# Patient Record
Sex: FEMALE | Race: WHITE
[De-identification: names, ages, dates, MRNs, and addresses within clinical notes are randomized per-mention and may not be internally consistent; named-entity substitution may affect disease eponyms.]

---

## 2018-12-20 ENCOUNTER — HOSPITAL ENCOUNTER (EMERGENCY)
Dept: HOSPITAL 97 - ER | Age: 33
Discharge: ANOTHER HOSPITAL | End: 2018-12-20
Payer: COMMERCIAL

## 2018-12-20 DIAGNOSIS — L03.211: Primary | ICD-10-CM

## 2018-12-20 DIAGNOSIS — K04.7: ICD-10-CM

## 2018-12-20 LAB
BUN BLD-MCNC: 8 MG/DL (ref 7–18)
GLUCOSE SERPLBLD-MCNC: 103 MG/DL (ref 74–106)
HCT VFR BLD CALC: 36.5 % (ref 36–45)
LYMPHOCYTES # SPEC AUTO: 1.4 K/UL (ref 0.7–4.9)
MCH RBC QN AUTO: 30.6 PG (ref 27–35)
MCV RBC: 91.8 FL (ref 80–100)
PMV BLD: 9 FL (ref 7.6–11.3)
POTASSIUM SERPL-SCNC: 4.2 MMOL/L (ref 3.5–5.1)
RBC # BLD: 3.97 M/UL (ref 3.86–4.86)

## 2018-12-20 PROCEDURE — 76377 3D RENDER W/INTRP POSTPROCES: CPT

## 2018-12-20 PROCEDURE — 96367 TX/PROPH/DG ADDL SEQ IV INF: CPT

## 2018-12-20 PROCEDURE — 87040 BLOOD CULTURE FOR BACTERIA: CPT

## 2018-12-20 PROCEDURE — 99285 EMERGENCY DEPT VISIT HI MDM: CPT

## 2018-12-20 PROCEDURE — 81003 URINALYSIS AUTO W/O SCOPE: CPT

## 2018-12-20 PROCEDURE — 80048 BASIC METABOLIC PNL TOTAL CA: CPT

## 2018-12-20 PROCEDURE — 36415 COLL VENOUS BLD VENIPUNCTURE: CPT

## 2018-12-20 PROCEDURE — 81025 URINE PREGNANCY TEST: CPT

## 2018-12-20 PROCEDURE — 96365 THER/PROPH/DIAG IV INF INIT: CPT

## 2018-12-20 PROCEDURE — 85025 COMPLETE CBC W/AUTO DIFF WBC: CPT

## 2018-12-20 PROCEDURE — 70487 CT MAXILLOFACIAL W/DYE: CPT

## 2018-12-20 PROCEDURE — 96361 HYDRATE IV INFUSION ADD-ON: CPT

## 2018-12-20 NOTE — EDPHYS
Physician Documentation                                                                           

 [facility]                                                                

Name: Emma Nelson                                                                                

Age: 33 yrs                                                                                       

Sex: Female                                                                                       

: 1985                                                                                   

MRN: [id number]                                                                                   

Arrival Date: 2018                                                                          

Time: 09:37                                                                                       

Account#: [id number]                                                                            

Bed 8                                                                                             

Private MD: None, None                                                                            

ED Physician Tanvir Mcdonough                                                                       

HPI:                                                                                              

                                                                                             

10:05 This 33 yrs old  Female presents to ER via Ambulatory with complaints of       cp  

      Facial Swelling.                                                                            

10:05 The patient presents with swelling.                                                     cp  

10:05 The problem is located in the right facial cheek. Onset: The symptoms/episode           cp  

      began/occurred 2 day(s) ago. Duration: The symptoms are continuous, and are steadily        

      getting worse. Associated signs and symptoms: Pertinent negatives: dysphagia, fever,        

      inability to eat, redness in area, vomiting.                                                

                                                                                                  

OB/GYN:                                                                                           

09:54 LMP 2018                                                                           hb  

                                                                                                  

Historical:                                                                                       

- Allergies:                                                                                      

09:54 No Known Allergies;                                                                     hb  

09:54 No Known Allergies;                                                                     tw2 

- Home Meds:                                                                                      

09:54 None [Active];                                                                          hb  

09:54 None [Active];                                                                          tw2 

- PMHx:                                                                                           

09:54 None;                                                                                   hb  

09:54 None;                                                                                   tw2 

- PSHx:                                                                                           

09:54 ;                                                                              hb  

09:54 ;                                                                              tw2 

                                                                                                  

- Immunization history:: Adult Immunizations up to date, Adult Immunizations.                     

- Social history:: Smoking status: Patient/guardian denies using tobacco, Smoking                 

  status: .                                                                                       

- Ebola Screening: : No symptoms or risks identified at this time Patient denies travel           

  to an Ebola-affected area in the 21 days before illness onset.                                  

                                                                                                  

                                                                                                  

ROS:                                                                                              

10:10 Constitutional: Negative for body aches, chills, fever, poor PO intake.                 cp  

10:10 Eyes: Negative for injury, pain, redness, and discharge.                                cp  

10:10 ENT: Negative for drainage from ear(s), ear pain, sore throat, difficulty swallowing,       

      difficulty handling secretions.                                                             

10:10 Cardiovascular: Negative for chest pain, edema, palpitations.                               

10:10 Respiratory: Negative for cough, shortness of breath, wheezing.                             

10:10 Abdomen/GI: Negative for abdominal pain, nausea, vomiting, and diarrhea, anorexia,          

      black/tarry stool, rectal bleeding.                                                         

10:10 Back: Negative for pain at rest, pain with movement, radiated pain.                         

10:10 Skin: Positive for swelling, of the right facial cheek.                                     

10:10 Neuro: Negative for altered mental status, headache, weakness.                              

10:10 All other systems are negative.                                                             

                                                                                                  

Exam:                                                                                             

10:15 Constitutional: The patient appears in no acute distress, alert, awake, non-toxic, well cp  

      developed, well nourished.                                                                  

10:15 Head/face: Noted is swelling, that is mild, of the  right cheek, tenderness, that is    cp  

      mild, of the  right cheek.                                                                  

10:15 Eyes: Periorbital structures: appear normal, Pupils: equal, round, and reactive to          

      light and accomodation, Extraocular movements: intact throughout, Conjunctiva: normal,      

      no exudate, no injection, Sclera: no appreciated abnormality, Lids and lashes: appear       

      normal, bilaterally.                                                                        

10:15 ENT: External ear(s): are unremarkable, Ear canal(s): are normal, clear, TM's: bulging,     

      is not appreciated, bilaterally, dullness, bilaterally, erythema, is not appreciated,       

      bilaterally, Nose: is normal, Mouth: Lips: moist, Oral mucosa: pink and intact, moist,      

      Posterior pharynx: Airway: no evidence of obstruction, patent, Tonsils: are normal in       

      appearance, Uvula: midline, swelling, is not appreciated, erythema, is not appreciated,     

      exudate, is not appreciated, Dental exam: dental caries, that is moderate, diffusely,       

      gum swelling, that is mild, specifically in the  right upper outer gumline, pain, that      

      is mild, specifically in the  right upper, Voice: is normal.                                

10:15 Neck: ROM/movement: is normal, is supple, without pain, no range of motions                 

      limitations, no meningismus, no nuchal rigidity.                                            

10:15 Chest/axilla: Inspection: normal, Palpation: is normal, no crepitus, no tenderness.         

10:15 Cardiovascular: Rate: tachycardic, Rhythm: regular.                                         

10:15 Respiratory: the patient does not display signs of respiratory distress,  Respirations:     

      normal, no use of accessory muscles, no retractions, no splinting, no tachypnea,            

      labored breathing, is not present, Breath sounds: are clear throughout, no decreased        

      breath sounds, no stridor, no wheezing.                                                     

10:15 Abdomen/GI: Inspection: abdomen appears normal, Palpation: abdomen is soft and              

      non-tender, in all quadrants.                                                               

10:15 Neuro: Orientation: to person, place \T\ time. Mentation: is normal, Cerebellar function:   

      is grossly normal, Motor: moves all fours, strength is normal, Sensation: is normal.        

                                                                                                  

Vital Signs:                                                                                      

09:54  / 100; Pulse 103; Resp 16; Temp 98; Pulse Ox 100% on R/A; Pain 2/10;             hb  

10:20  / 87; Pulse 83; Resp 17; Pulse Ox 100% on R/A;                                   tw2 

11:30  / 86; Pulse 66; Resp 17; Pulse Ox 100% on R/A;                                   tw2 

12:30  / 78; Pulse 66; Resp 17; Pulse Ox 99% on R/A;                                    tw2 

13:30  / 90; Pulse 66; Resp 17; Pulse Ox 99% on R/A;                                    tw2 

                                                                                                  

MDM:                                                                                              

09:52 Patient medically screened.                                                             cp  

10:15 Differential diagnosis: dental caries, dental abscess, pericoronitis, cellulitis.       cp  

12:00 Data reviewed: vital signs, nurses notes, lab test result(s), radiologic studies, CT    cp  

      scan, and as a result, I will administer antibiotics Flagyl, Clindamycin.                   

12:00 Counseling: I had a detailed discussion with the patient and/or guardian regarding: the cp  

      historical points, exam findings, and any diagnostic results supporting the                 

      discharge/admit diagnosis, lab results, radiology results, the need to transfer to          

      another facility, [facility] does not immediately have the required       

      specialist. Response to treatment: the patient's symptoms have mildly improved after        

      treatment.                                                                                  

13:20 Physician consultation: DR Gonzalez, maxillary facial surgeon \T\[facility], will accept patient  cp  

      as transfer to ED.                                                                          

                                                                                                  

                                                                                             

10:00 Order name: CBC with Diff; Complete Time: 11:05                                         cp  

                                                                                             

11:05 Interpretation: Normal except: RDW 15.9; MARCO ANTONIO% 77.7.                                     cp  

                                                                                             

10:00 Order name: BMP; Complete Time: 10:57                                                   cp  

                                                                                             

10:57 Interpretation: Normal except: .                                                  cp  

                                                                                             

10:00 Order name: CT Facial Bones W/ Con \T\ Mpr; Complete Time: 11:48                          cp

                                                                                             

10:00 Order name: Blood Culture Adult (2)                                                     cp  

                                                                                             

11:30 Order name: Urine Dipstick--Ancillary (enter results); Complete Time: 12:55             eb  

                                                                                             

12:55 Interpretation: Reviewed.                                                               cp  

                                                                                             

11:30 Order name: Urine Pregnancy--Ancillary (enter results); Complete Time: 12:55            eb  

                                                                                             

12:55 Interpretation: Reviewed.                                                               cp  

                                                                                             

10:00 Order name: Urine Dipstick-Ancillary (obtain specimen); Complete Time: 12:26            cp  

                                                                                             

10:00 Order name: Urine Pregnancy Test (obtain specimen); Complete Time: 12:26                cp  

                                                                                             

10:00 Order name: IV; Complete Time: 10:19                                                    cp  

                                                                                             

12:22 Order name: NPO; Complete Time: 14:03                                                   cp  

                                                                                                  

Administered Medications:                                                                         

10:19 Drug: NS 0.9% 1000 ml Route: IV; Rate: 1 bolus; Site: left antecubital;                 tw2 

13:10 Follow up: Response: No adverse reaction; IV Status: Completed infusion; IV Intake:     tw2 

      1000ml                                                                                      

12:20 Drug: Clindamycin 600 mg Route: IVPB; Infused Over: 30 mins; Site: left antecubital;    tw2 

12:50 Follow up: Response: No adverse reaction; IV Status: Completed infusion                 tw2 

13:33 Drug: metroNIDAZOLE 500 mg Volume: 100 ml; Route: IVPB; Infused Over: 30 mins; Site:    sg  

      left antecubital;                                                                           

13:55 Follow up: Response: No adverse reaction; IV Status: Completed infusion                 tw2 

                                                                                                  

                                                                                                  

Disposition:                                                                                      

15:20 Chart complete.                                                                         cp  

                                                                                                  

Disposition:                                                                                      

18 13:25 Transfer ordered to [facility]. Diagnosis are Cellulitis of face, Right        

  periapical maxillary tooth abscess.                                                             

- Reason for transfer: Specialty.                                                                 

- Accepting physician is DR ZHANNA Gonzalez.                                                            

- Condition is Stable.                                                                            

- Problem is new.                                                                                 

- Symptoms have improved.                                                                         

                                                                                                  

                                                                                                  

                                                                                                  

Signatures:                                                                                       

Dispatcher [employer]                           Jonathan Kirkland RN                         RN   Edin Elliott PA PA   cp                                                   

Analia Dunbar RN RN hb Wise, Tara, RN                          RN   tw2                                                  

Camryn Telles                                   [facility]                                                  

                                                                                                  

Corrections: (The following items were deleted from the chart)                                    

13:26 13:25 2018 13:25 Transfer ordered to [facility]. Diagnosis is Cellulitis of   cp  

      face; Dental Abscess. Reason for transfer: Specialty. Accepting physician is DR ZHANNA Gonzalez. Condition is Stable. Problem is new. Symptoms have improved. cp                     

15:14 13:26 2018 13:25 Transfer ordered to [facility]. Diagnosis is Cellulitis of   lt1 

      face; Right periapical maxillary tooth abscess. Reason for transfer: Specialty.             

      Accepting physician is DR ZHANNA Gonzalez. Condition is Stable. Problem is new. Symptoms have     

      improved. cp                                                                                

                                                                                                  

**************************************************************************************************

## 2018-12-20 NOTE — ER
Nurse's Notes                                                                                     

 [facility]                                                                

Name: Emma Nelson                                                                                

Age: 33 yrs                                                                                       

Sex: Female                                                                                       

: 1985                                                                                   

MRN: [id number]                                                                                   

Arrival Date: 2018                                                                          

Time: 09:37                                                                                       

Account#: [id number]                                                                            

Bed 8                                                                                             

Private MD: None, None                                                                            

Diagnosis: Cellulitis of face;Right periapical maxillary tooth abscess                            

                                                                                                  

Presentation:                                                                                     

                                                                                             

09:52 Presenting complaint: Right sided facial swelling x 2 days. Transition of care: patient hb  

      was not received from another setting of care. Onset of symptoms was 2018.     

      Risk Assessment: Do you want to hurt yourself or someone else? Patient reports no           

      desire to harm self or others. Care prior to arrival: None.                                 

09:52 Method Of Arrival: Ambulatory                                                           hb  

09:52 Acuity: MILA 3                                                                           hb  

09:54 Initial Sepsis Screen: Does the patient meet any 2 criteria? No. Patient's initial      hb  

      sepsis screen is negative. Does the patient have a suspected source of infection? No.       

      Patient's initial sepsis screen is negative.                                                

                                                                                                  

OB/GYN:                                                                                           

09:54 LMP 2018                                                                           hb  

                                                                                                  

Historical:                                                                                       

- Allergies:                                                                                      

09:54 No Known Allergies;                                                                     hb  

09:54 No Known Allergies;                                                                     tw2 

- Home Meds:                                                                                      

09:54 None [Active];                                                                          hb  

09:54 None [Active];                                                                          tw2 

- PMHx:                                                                                           

09:54 None;                                                                                   hb  

09:54 None;                                                                                   tw2 

- PSHx:                                                                                           

09:54 ;                                                                              hb  

09:54 ;                                                                              tw2 

                                                                                                  

- Immunization history:: Adult Immunizations up to date, Adult Immunizations.                     

- Social history:: Smoking status: Patient/guardian denies using tobacco, Smoking                 

  status: .                                                                                       

- Ebola Screening: : No symptoms or risks identified at this time Patient denies travel           

  to an Ebola-affected area in the 21 days before illness onset.                                  

                                                                                                  

                                                                                                  

Screenin:51 Abuse screen: Denies threats or abuse. Nutritional screening: No deficits noted.        tw2 

      Tuberculosis screening: No symptoms or risk factors identified. Fall Risk None              

      identified.                                                                                 

                                                                                                  

Assessment:                                                                                       

09:54 General: Appears in no apparent distress. Behavior is calm, cooperative, appropriate    tw2 

      for age. Pain: Complains of pain in right cheek and right jaw. Neuro: Level of              

      Consciousness is awake, alert, obeys commands, Oriented to person, place, time.             

      Cardiovascular: Capillary refill < 3 seconds Patient's skin is warm and dry.                

      Respiratory: Airway is patent Respiratory effort is even, unlabored, Respiratory            

      pattern is regular, symmetrical. GI: No signs and/or symptoms were reported involving       

      the gastrointestinal system. : No signs and/or symptoms were reported regarding the       

      genitourinary system. EENT: No signs and/or symptoms were reported regarding the EENT       

      system. EENT: Reports pain in right cheek and right jaw. Derm: No signs and/or symptoms     

      reported regarding the dermatologic system. Musculoskeletal: No signs and/or symptoms       

      reported regarding the musculoskeletal system.                                              

10:21 Reassessment: Patient appears in no apparent distress at this time. No changes from     tw2 

      previously documented assessment. Patient and/or family updated on plan of care and         

      expected duration. Pain level reassessed. Patient is alert, oriented x 3, equal             

      unlabored respirations, skin warm/dry/pink.                                                 

11:45 Reassessment: Patient appears in no apparent distress at this time. No changes from     tw2 

      previously documented assessment. Patient and/or family updated on plan of care and         

      expected duration. Pain level reassessed. Patient is alert, oriented x 3, equal             

      unlabored respirations, skin warm/dry/pink.                                                 

12:30 Reassessment: Patient appears in no apparent distress at this time. No changes from     tw2 

      previously documented assessment. Patient and/or family updated on plan of care and         

      expected duration. Pain level reassessed. Patient is alert, oriented x 3, equal             

      unlabored respirations, skin warm/dry/pink.                                                 

13:30 Reassessment: Patient appears in no apparent distress at this time. No changes from     tw2 

      previously documented assessment. Patient and/or family updated on plan of care and         

      expected duration. Pain level reassessed. Patient is alert, oriented x 3, equal             

      unlabored respirations, skin warm/dry/pink.                                                 

14:05 Reassessment: Patient appears in no apparent distress at this time. No changes from     tw2 

      previously documented assessment. Patient and/or family updated on plan of care and         

      expected duration. Pain level reassessed. Patient is alert, oriented x 3, equal             

      unlabored respirations, skin warm/dry/pink.                                                 

                                                                                                  

Vital Signs:                                                                                      

09:54  / 100; Pulse 103; Resp 16; Temp 98; Pulse Ox 100% on R/A; Pain 2/10;             hb  

10:20  / 87; Pulse 83; Resp 17; Pulse Ox 100% on R/A;                                   tw2 

11:30  / 86; Pulse 66; Resp 17; Pulse Ox 100% on R/A;                                   tw2 

12:30  / 78; Pulse 66; Resp 17; Pulse Ox 99% on R/A;                                    tw2 

13:30  / 90; Pulse 66; Resp 17; Pulse Ox 99% on R/A;                                    tw2 

                                                                                                  

ED Course:                                                                                        

09:37 Patient arrived in ED.                                                                  sb2 

09:37 None, None is Private Physician.                                                        sb2 

09:48 Azalia Sarabia RN is Primary Nurse.                                                        tw2 

09:51 Edin Glover PA is PHCP.                                                                cp  

09:51 Tanvir Mcdonough MD is Attending Physician.                                              cp  

09:52 Bed in low position. Call light in reach. Pulse ox on. NIBP on.                         tw2 

09:52 Arm band placed on.                                                                     tw2 

09:53 Triage completed.                                                                       hb  

11:10 Radiology exam delayed due to pregnancy test not completed at this time.                [id number] 

11:36 CT Facial Bones W/ Con \T\ Mpr In Process Unspecified.                                    EDMS

12:17 initiated a transfer with Elen at the [facility] Transfer center/ they do not have   eb  

      oral maxillofacial surgeons on call and can not accept the patient in transfer.             

12:22 initiated a transfer with Yamileth at the [facility] transfer Center/ At this time  eb  

      they are unable to take any patients in transfer due to being at capacity.                  

12:50 initiated a transfer with Destiny at the [facility] transfer center.                           eb  

13:06 connected the [facility] doctor on call for [facility] with Belen CESAR for patient transfer             eb  

      consultation.                                                                               

13:31 administrative approval given by Destiny Elizalde RN  at the [facility]  

      hospital/ patient is going to the ER/ Dr. Gonzalez has accepted the patient in transfer/      

      report to be called to [phone].                                                        

15:10 No provider procedures requiring assistance completed. Patient transferred, IV remains  sg  

      in place. intact, No redness/swelling at site.                                              

                                                                                                  

Administered Medications:                                                                         

10:19 Drug: NS 0.9% 1000 ml Route: IV; Rate: 1 bolus; Site: left antecubital;                 tw2 

13:10 Follow up: Response: No adverse reaction; IV Status: Completed infusion; IV Intake:     tw2 

      1000ml                                                                                      

12:20 Drug: Clindamycin 600 mg Route: IVPB; Infused Over: 30 mins; Site: left antecubital;    tw2 

12:50 Follow up: Response: No adverse reaction; IV Status: Completed infusion                 tw2 

13:33 Drug: metroNIDAZOLE 500 mg Volume: 100 ml; Route: IVPB; Infused Over: 30 mins; Site:    sg  

      left antecubital;                                                                           

13:55 Follow up: Response: No adverse reaction; IV Status: Completed infusion                 tw2 

                                                                                                  

                                                                                                  

Intake:                                                                                           

13:10 IV: 1000ml; Total: 1000ml.                                                              tw2 

                                                                                                  

Outcome:                                                                                          

13:25 ER care complete, transfer ordered by MD.                                               jordan  

15:10 Transferred by ground EMS Transfer form completed. Note:  report given to [facility]  

      Ambulance Team Member                                                                       

15:10 Condition: stable                                                                           

15:10 Instructed on the need for transfer, safety practices, Demonstrated understanding of        

      instructions.                                                                               

15:14 Patient left the ED.                                                                    lt1 

                                                                                                  

Signatures:                                                                                       

Dispatcher [employer]                           EDJonathan Corrigan RN                         RN   Edin Elliott PA PA cp Baxter, Heather, RN RN hb Wise, Tara, RN RN   tw2                                                  

Loli Oakley                               2                                                  

Joanna Schilling Jessica j                                                  

Camryn Telles                                   lt1                                                  

                                                                                                  

Corrections: (The following items were deleted from the chart)                                    

11:20 09:52 Acuity: MILA 4 hb                                                                  hb  

                                                                                                  

**************************************************************************************************

## 2018-12-20 NOTE — RAD REPORT
EXAM DESCRIPTION:  CTFacial Bones W Con   Mpr12/20/2018 11:35 am

 

CLINICAL HISTORY:  Right facial pain and swelling

 

COMPARISON:  None.

 

TECHNIQUE:  Computed axial tomography of the face obtained with coronal and sagittal reconstruction. 
50 cc Isovue-300 administered intravenously

 

All CT scans are performed using dose optimization technique as appropriate and may include automated
 exposure control or mA/KV adjustment according to patient size.

 

FINDINGS:  A 12 millimeter abscess lies superficial to the right maxilla. . A lucency is present with
in a an adjacent right maxillary tooth.

 

The parotid and submandibular glands appear unremarkable.

 

The parapharyngeal fat is clear.

 

Fluid within the sinuses is not noted.

 

IMPRESSION:  Right periapical maxillary tooth abscess with adjacent 12 millimeter soft tissue abscess


.